# Patient Record
Sex: FEMALE | Race: BLACK OR AFRICAN AMERICAN | URBAN - METROPOLITAN AREA
[De-identification: names, ages, dates, MRNs, and addresses within clinical notes are randomized per-mention and may not be internally consistent; named-entity substitution may affect disease eponyms.]

---

## 2019-12-17 ENCOUNTER — APPOINTMENT (RX ONLY)
Dept: URBAN - METROPOLITAN AREA CLINIC 174 | Facility: CLINIC | Age: 39
Setting detail: DERMATOLOGY
End: 2019-12-17

## 2019-12-17 DIAGNOSIS — L81.0 POSTINFLAMMATORY HYPERPIGMENTATION: ICD-10-CM

## 2019-12-17 PROCEDURE — 99203 OFFICE O/P NEW LOW 30 MIN: CPT

## 2019-12-17 PROCEDURE — ? COUNSELING

## 2019-12-17 PROCEDURE — ? OBSERVATION

## 2019-12-17 PROCEDURE — ? TREATMENT REGIMEN

## 2019-12-17 PROCEDURE — ? PRESCRIPTION

## 2019-12-17 PROCEDURE — ? WOOD'S LAMP

## 2019-12-17 ASSESSMENT — LOCATION ZONE DERM
LOCATION ZONE: TRUNK
LOCATION ZONE: ARM

## 2019-12-17 ASSESSMENT — LOCATION DETAILED DESCRIPTION DERM
LOCATION DETAILED: LEFT DISTAL DORSAL FOREARM
LOCATION DETAILED: RIGHT DISTAL DORSAL FOREARM
LOCATION DETAILED: SUPERIOR THORACIC SPINE

## 2019-12-17 ASSESSMENT — SEVERITY ASSESSMENT: SEVERITY: MILD TO MODERATE

## 2019-12-17 ASSESSMENT — LOCATION SIMPLE DESCRIPTION DERM
LOCATION SIMPLE: UPPER BACK
LOCATION SIMPLE: LEFT FOREARM
LOCATION SIMPLE: RIGHT FOREARM

## 2019-12-17 NOTE — PROCEDURE: WOOD'S LAMP
Response To Light: negative for blue-green flourescence
Wood's Lamp Text: A Wood's Lamp was used to illuminate areas of the skin with a black light. The light was held over the suspected areas in a darkened room.
Detail Level: Generalized

## 2019-12-17 NOTE — HPI: RASH
What Type Of Note Output Would You Prefer (Optional)?: Bullet Format
Is The Patient Presenting As Previously Scheduled?: Yes
How Severe Is Your Rash?: mild
Is This A New Presentation, Or A Follow-Up?: Rash
Additional History: The pt states her skin issues started in October when she dyed her hair and had an allergic reaction to the dye.  Her scalp broke out and then she states her face swelled.  She went to the ER and was prescribed oral prednisone which she did not finish because it made her nauseous.  2 days later she broke out all over her body with \"bumps\" and \"welts.\"  She went to [facility] dermatology and a punch biopsy was done but she is not sure what the results were.  She states the doctor told her it was a \"virus\" and she was prescribed a steroid cream that she mixed with Cetaphil and applied to her body which caused \"my skin to come off my butt.\"  Since then she has not done any further treatment.  The hives/bumps resolved but now she has white marks all over her body where the welts/bumps were, which is her main concern today.  She also c/o mild itch.

## 2019-12-17 NOTE — PROCEDURE: TREATMENT REGIMEN
Plan: I reviewed the pictures the pt showed me on her phone.  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  The patient has no active rash now.  Wood's lamp examination was negative for fluorescence.  This looks to be post-inflammatory hypopigmentation secondary to the original ACD rash.  I reassured her that this should fade with time as long as she does not develop additional rashes or inflammatory conditions.  We did discuss patch testing if she does have any additional rashes to test for allergies, but at this time I do not think that is necessary.  Since the pt states she had a reaction with the TAC/Cetaphil, I am hesitant to use topical steroids at this time until I get the records from [facility], although this may be the best thing to speed up the repigmentation process.  She may also benefit from light box therapy at [facility], but I doubt she will agree to this.  I will see her back in 2 weeks to reassess. Plan: I reviewed the pictures the pt showed me on her phone.  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  The patient has no active rash now.  Wood's lamp examination was negative for fluorescence.  This looks to be post-inflammatory hypopigmentation secondary to the original ACD rash.  I reassured her that this should fade with time as long as she does not develop additional rashes or inflammatory conditions.  We did discuss patch testing if she does have any additional rashes to test for allergies, but at this time I do not think that is necessary.  Since the pt states she had a reaction with the TAC/Cetaphil, I am hesitant to use topical steroids at this time until I get the records from [facility], although this may be the best thing to speed up the repigmentation process.  She may also benefit from light box therapy at [facility], but I doubt she will agree to this.  I will see her back in 2 weeks to reassess.

## 2019-12-31 ENCOUNTER — APPOINTMENT (RX ONLY)
Dept: URBAN - METROPOLITAN AREA CLINIC 174 | Facility: CLINIC | Age: 39
Setting detail: DERMATOLOGY
End: 2019-12-31

## 2019-12-31 DIAGNOSIS — L81.0 POSTINFLAMMATORY HYPERPIGMENTATION: ICD-10-CM

## 2019-12-31 PROCEDURE — ? PRESCRIPTION

## 2019-12-31 PROCEDURE — ? COUNSELING

## 2019-12-31 PROCEDURE — ? TREATMENT REGIMEN

## 2019-12-31 PROCEDURE — 99213 OFFICE O/P EST LOW 20 MIN: CPT

## 2019-12-31 RX ORDER — TRIAMCINOLONE ACETONIDE 1 MG/G
1 APPLICATION OINTMENT TOPICAL BID
Qty: 1 | Refills: 0 | Status: ERX | COMMUNITY
Start: 2019-12-31

## 2019-12-31 RX ADMIN — TRIAMCINOLONE ACETONIDE 1 APPLICATION: 1 OINTMENT TOPICAL at 00:00

## 2019-12-31 ASSESSMENT — LOCATION ZONE DERM: LOCATION ZONE: TRUNK

## 2019-12-31 ASSESSMENT — LOCATION SIMPLE DESCRIPTION DERM: LOCATION SIMPLE: UPPER BACK

## 2019-12-31 ASSESSMENT — LOCATION DETAILED DESCRIPTION DERM: LOCATION DETAILED: SUPERIOR THORACIC SPINE

## 2019-12-31 NOTE — PROCEDURE: TREATMENT REGIMEN
Plan: Options for treatment were discussed with the pt.  We discussed treating with topical steroids to speed up repigmentation as well as light box therapy (as recommended by Dr Green).  The pt is interested in both of these.  I told her she would need to go back to [facility] for light box therapy and she states she will call to make an appt if her insurance covers this.  In the meantime she will start with topical steroids.  She was instructed to treat in a stepwise fashion, 2 weeks on 2 weeks off, one area at a time to minimize systemic absorption and it was reiterated to use the ointment sparingly.  I suggested starting with her dorsal forearms first, applying the TAC sparingly for 2 weeks then take a week off.  Then move on to her upper arms x2 weeks then take a week off, and so on.  I will see her back in 6 weeks. Plan: Options for treatment were discussed with the pt.  We discussed treating with topical steroids to speed up repigmentation as well as light box therapy (as recommended by Dr Green).  The pt is interested in both of these.  I told her she would need to go back to [facility] for light box therapy and she states she will call to make an appt if her insurance covers this.  In the meantime she will start with topical steroids.  She was instructed to treat in a stepwise fashion, 2 weeks on 2 weeks off, one area at a time to minimize systemic absorption and it was reiterated to use the ointment sparingly.  I suggested starting with her dorsal forearms first, applying the TAC sparingly for 2 weeks then take a week off.  Then move on to her upper arms x2 weeks then take a week off, and so on.  I will see her back in 6 weeks.